# Patient Record
Sex: FEMALE | Race: BLACK OR AFRICAN AMERICAN | HISPANIC OR LATINO | Employment: FULL TIME | URBAN - METROPOLITAN AREA
[De-identification: names, ages, dates, MRNs, and addresses within clinical notes are randomized per-mention and may not be internally consistent; named-entity substitution may affect disease eponyms.]

---

## 2018-06-22 ENCOUNTER — WORRISOME GROWTH - SEE NOTE (OUTPATIENT)
Dept: URBAN - METROPOLITAN AREA CLINIC 36 | Facility: CLINIC | Age: 48
Setting detail: DERMATOLOGY
End: 2018-06-22

## 2018-06-22 PROBLEM — R21 RASH AND OTHER NONSPECIFIC SKIN ERUPTION: Status: RESOLVED | Noted: 2018-06-22

## 2018-06-22 PROBLEM — R21 RASH AND OTHER NONSPECIFIC SKIN ERUPTION: Status: ACTIVE | Noted: 2018-06-22

## 2018-06-22 PROCEDURE — 99202 OFFICE O/P NEW SF 15 MIN: CPT

## 2018-09-12 ENCOUNTER — FOLLOW UP (OUTPATIENT)
Dept: URBAN - METROPOLITAN AREA CLINIC 31 | Facility: CLINIC | Age: 48
Setting detail: DERMATOLOGY
End: 2018-09-12

## 2018-09-12 PROCEDURE — 99213 OFFICE O/P EST LOW 20 MIN: CPT

## 2018-09-13 ENCOUNTER — RX ONLY (RX ONLY)
Age: 48
End: 2018-09-13

## 2018-09-13 RX ORDER — FLUOCINOLONE ACETONIDE, HYDROQUINONE, AND TRETINOIN .1; 40; .5 MG/G; MG/G; MG/G
1 APPLICATION CREAM TOPICAL DAILY
Qty: 30 | Refills: 4
Start: 2018-09-13

## 2018-11-12 ENCOUNTER — FOLLOW UP (OUTPATIENT)
Dept: URBAN - METROPOLITAN AREA CLINIC 32 | Facility: CLINIC | Age: 48
Setting detail: DERMATOLOGY
End: 2018-11-12

## 2018-11-12 PROCEDURE — 99213 OFFICE O/P EST LOW 20 MIN: CPT

## 2019-04-03 ENCOUNTER — FOLLOW UP (OUTPATIENT)
Dept: URBAN - METROPOLITAN AREA CLINIC 31 | Facility: CLINIC | Age: 49
Setting detail: DERMATOLOGY
End: 2019-04-03

## 2019-04-03 ENCOUNTER — RX ONLY (RX ONLY)
Age: 49
End: 2019-04-03

## 2019-04-03 DIAGNOSIS — B07.9 VIRAL WART, UNSPECIFIED: ICD-10-CM

## 2019-04-03 PROCEDURE — 99214 OFFICE O/P EST MOD 30 MIN: CPT

## 2019-08-13 ENCOUNTER — FOLLOW UP (OUTPATIENT)
Dept: URBAN - METROPOLITAN AREA CLINIC 32 | Facility: CLINIC | Age: 49
Setting detail: DERMATOLOGY
End: 2019-08-13

## 2019-08-13 ENCOUNTER — RX ONLY (RX ONLY)
Age: 49
End: 2019-08-13

## 2019-08-13 DIAGNOSIS — L72.0 EPIDERMAL CYST: ICD-10-CM

## 2019-08-13 PROCEDURE — 99213 OFFICE O/P EST LOW 20 MIN: CPT

## 2019-08-13 RX ORDER — FLUOCINONIDE 0.5 MG/ML
1 ML SOLUTION TOPICAL DAILY
Qty: 1 | Refills: 3
Start: 2019-08-13

## 2019-08-13 RX ORDER — TRIAMCINOLONE ACETONIDE 1 MG/G
1 APPLICATION CREAM TOPICAL BID
Qty: 45 | Refills: 1 | Status: DISCONTINUED
Start: 2019-08-13 | End: 2019-10-23

## 2019-10-23 ENCOUNTER — RX ONLY (RX ONLY)
Age: 49
End: 2019-10-23

## 2019-10-23 ENCOUNTER — SEE NOTE (OUTPATIENT)
Dept: URBAN - METROPOLITAN AREA CLINIC 31 | Facility: CLINIC | Age: 49
Setting detail: DERMATOLOGY
End: 2019-10-23

## 2019-10-23 DIAGNOSIS — L57.0 ACTINIC KERATOSIS: ICD-10-CM

## 2019-10-23 PROCEDURE — 99213 OFFICE O/P EST LOW 20 MIN: CPT

## 2019-10-23 RX ORDER — FLUOCINOLONE ACETONIDE, HYDROQUINONE, AND TRETINOIN .1; 40; .5 MG/G; MG/G; MG/G
1 APPLICATION CREAM TOPICAL DAILY
Qty: 30 | Refills: 4
Start: 2019-10-23

## 2019-10-23 RX ORDER — HYDROQUINONE 40 MG/G
1 APPLICATION CREAM TOPICAL ADD'L SIG
Qty: 30 | Refills: 2 | Status: DISCONTINUED
Start: 2019-10-23 | End: 2020-01-08

## 2019-10-23 RX ORDER — TRIAMCINOLONE ACETONIDE 1 MG/G
1 APPLICATION CREAM TOPICAL BID
Qty: 45 | Refills: 1
Start: 2019-10-23

## 2019-12-11 ENCOUNTER — SEE NOTE (OUTPATIENT)
Dept: URBAN - METROPOLITAN AREA CLINIC 31 | Facility: CLINIC | Age: 49
Setting detail: DERMATOLOGY
End: 2019-12-11

## 2019-12-11 DIAGNOSIS — B36.0 PITYRIASIS VERSICOLOR: ICD-10-CM

## 2019-12-11 PROCEDURE — 99212 OFFICE O/P EST SF 10 MIN: CPT

## 2019-12-23 ENCOUNTER — FOLLOW UP (OUTPATIENT)
Dept: URBAN - METROPOLITAN AREA CLINIC 32 | Facility: CLINIC | Age: 49
Setting detail: DERMATOLOGY
End: 2019-12-23

## 2019-12-23 DIAGNOSIS — L72.3 SEBACEOUS CYST: ICD-10-CM

## 2019-12-23 PROCEDURE — 99212 OFFICE O/P EST SF 10 MIN: CPT

## 2020-01-08 ENCOUNTER — FOLLOW UP (OUTPATIENT)
Dept: URBAN - METROPOLITAN AREA CLINIC 31 | Facility: CLINIC | Age: 50
Setting detail: DERMATOLOGY
End: 2020-01-08

## 2020-01-08 DIAGNOSIS — L84 CORNS AND CALLOSITIES: ICD-10-CM

## 2020-01-08 PROCEDURE — OTHER NO CHARGE OFFICE VISIT: OTHER

## 2020-07-27 ENCOUNTER — RASH (OUTPATIENT)
Dept: URBAN - METROPOLITAN AREA CLINIC 32 | Facility: CLINIC | Age: 50
Setting detail: DERMATOLOGY
End: 2020-07-27

## 2020-07-27 DIAGNOSIS — L40.0 PSORIASIS VULGARIS: ICD-10-CM

## 2020-07-27 DIAGNOSIS — D48.5 NEOPLASM OF UNCERTAIN BEHAVIOR OF SKIN: ICD-10-CM

## 2020-07-27 PROCEDURE — 99213 OFFICE O/P EST LOW 20 MIN: CPT

## 2020-07-27 RX ORDER — TRIAMCINOLONE ACETONIDE 1 MG/G
1 APPLICATION CREAM TOPICAL BID
Qty: 80 | Refills: 0
Start: 2020-07-27

## 2020-08-18 ENCOUNTER — RX ONLY (RX ONLY)
Age: 50
End: 2020-08-18

## 2020-08-18 RX ORDER — CLOBETASOL PROPIONATE 0.5 MG/G
1 APPLICATION CREAM TOPICAL BID
Qty: 60 | Refills: 1
Start: 2020-08-18

## 2020-10-06 ENCOUNTER — RX ONLY (RX ONLY)
Age: 50
End: 2020-10-06

## 2020-10-06 ENCOUNTER — ECZEMA (OUTPATIENT)
Dept: URBAN - METROPOLITAN AREA CLINIC 32 | Facility: CLINIC | Age: 50
Setting detail: DERMATOLOGY
End: 2020-10-06

## 2020-10-06 DIAGNOSIS — L72.0 EPIDERMAL CYST: ICD-10-CM

## 2020-10-06 PROBLEM — R21 RASH AND OTHER NONSPECIFIC SKIN ERUPTION: Status: ACTIVE | Noted: 2020-10-06

## 2020-10-06 PROBLEM — R21 RASH AND OTHER NONSPECIFIC SKIN ERUPTION: Status: RESOLVED | Noted: 2020-10-06

## 2020-10-06 PROCEDURE — 99213 OFFICE O/P EST LOW 20 MIN: CPT

## 2020-10-19 ENCOUNTER — RASH (OUTPATIENT)
Dept: URBAN - METROPOLITAN AREA CLINIC 32 | Facility: CLINIC | Age: 50
Setting detail: DERMATOLOGY
End: 2020-10-19

## 2020-10-19 DIAGNOSIS — L82.0 INFLAMED SEBORRHEIC KERATOSIS: ICD-10-CM

## 2020-10-19 PROCEDURE — 11104 PUNCH BX SKIN SINGLE LESION: CPT

## 2020-10-19 PROCEDURE — 11105 PUNCH BX SKIN EA SEP/ADDL: CPT

## 2020-10-19 PROCEDURE — 99213 OFFICE O/P EST LOW 20 MIN: CPT

## 2020-10-27 ENCOUNTER — RX ONLY (RX ONLY)
Age: 50
End: 2020-10-27

## 2020-10-27 RX ORDER — HYDROQUINONE 40 MG/G
1 APPLICATION CREAM TOPICAL ADD'L SIG
Qty: 30 | Refills: 3
Start: 2020-10-27

## 2020-11-02 ENCOUNTER — SUTURE REMOVAL (OUTPATIENT)
Dept: URBAN - METROPOLITAN AREA CLINIC 32 | Facility: CLINIC | Age: 50
Setting detail: DERMATOLOGY
End: 2020-11-02

## 2020-11-02 DIAGNOSIS — Z80.8 FAMILY HISTORY OF MALIGNANT NEOPLASM OF OTHER ORGANS OR SYSTEMS: ICD-10-CM

## 2020-11-02 DIAGNOSIS — R20.2 PARESTHESIA OF SKIN: ICD-10-CM

## 2020-11-02 DIAGNOSIS — D22.5 MELANOCYTIC NEVI OF TRUNK: ICD-10-CM

## 2020-11-02 PROCEDURE — 99024 POSTOP FOLLOW-UP VISIT: CPT

## 2020-12-22 ENCOUNTER — RX ONLY (RX ONLY)
Age: 50
End: 2020-12-22

## 2020-12-22 RX ORDER — TRIAMCINOLONE ACETONIDE 1 MG/G
1 APPLICATION CREAM TOPICAL BID
Qty: 45 | Refills: 0
Start: 2020-12-22

## 2021-01-11 ENCOUNTER — RX ONLY (RX ONLY)
Age: 51
End: 2021-01-11

## 2021-01-11 RX ORDER — KETOCONAZOLE 20 MG/ML
1 APPLICATION SHAMPOO, SUSPENSION TOPICAL ADD'L SIG
Qty: 120 | Refills: 4 | Status: DISCONTINUED
Start: 2021-01-11 | End: 2021-03-15

## 2021-03-15 ENCOUNTER — RX ONLY (RX ONLY)
Age: 51
End: 2021-03-15

## 2021-03-15 ENCOUNTER — TELEHEALTH (OUTPATIENT)
Dept: URBAN - METROPOLITAN AREA CLINIC 30 | Facility: CLINIC | Age: 51
Setting detail: DERMATOLOGY
End: 2021-03-15

## 2021-03-15 DIAGNOSIS — L63.8 OTHER ALOPECIA AREATA: ICD-10-CM

## 2021-03-15 PROCEDURE — 99214 OFFICE O/P EST MOD 30 MIN: CPT

## 2021-03-15 RX ORDER — KETOCONAZOLE 20 MG/ML
1 APPLICATION SHAMPOO, SUSPENSION TOPICAL ADD'L SIG
Qty: 120 | Refills: 4
Start: 2021-03-15

## 2021-03-15 RX ORDER — FLUOCINONIDE 0.5 MG/ML
1 ML SOLUTION TOPICAL BID
Qty: 60 | Refills: 3
Start: 2021-03-15

## 2021-04-07 ENCOUNTER — RX ONLY (RX ONLY)
Age: 51
End: 2021-04-07

## 2021-04-07 RX ORDER — HYDROQUINONE 40 MG/G
A SMALL AMOUNT CREAM TOPICAL ONCE A DAY
Qty: 30 | Refills: 2
Start: 2021-04-07

## 2021-08-04 ENCOUNTER — P2P PATIENT RECORD (OUTPATIENT)
Age: 51
End: 2021-08-04

## 2021-08-27 ENCOUNTER — DASHBOARD ENCOUNTERS (OUTPATIENT)
Age: 51
End: 2021-08-27

## 2021-08-30 ENCOUNTER — TELEPHONE ENCOUNTER (OUTPATIENT)
Dept: URBAN - METROPOLITAN AREA CLINIC 17 | Facility: CLINIC | Age: 51
End: 2021-08-30

## 2021-08-30 ENCOUNTER — OFFICE VISIT (OUTPATIENT)
Dept: URBAN - METROPOLITAN AREA TELEHEALTH 2 | Facility: TELEHEALTH | Age: 51
End: 2021-08-30
Payer: COMMERCIAL

## 2021-08-30 DIAGNOSIS — Z12.11 COLON CANCER SCREENING: ICD-10-CM

## 2021-08-30 DIAGNOSIS — K59.09 CHANGE IN BOWEL MOVEMENTS INTERMITTENT CONSTIPATION. URGENCY IN THE MORNING.: ICD-10-CM

## 2021-08-30 PROBLEM — 14760008 CONSTIPATION: Status: ACTIVE | Noted: 2021-08-30

## 2021-08-30 PROCEDURE — 99202 OFFICE O/P NEW SF 15 MIN: CPT | Performed by: INTERNAL MEDICINE

## 2021-08-30 PROCEDURE — 99242 OFF/OP CONSLTJ NEW/EST SF 20: CPT | Performed by: INTERNAL MEDICINE

## 2021-08-30 RX ORDER — METOPROLOL SUCCINATE 50 MG/1
TABLET, EXTENDED RELEASE ORAL
Qty: 90 | Status: ACTIVE | COMMUNITY

## 2021-08-30 RX ORDER — ATORVASTATIN CALCIUM, FILM COATED 20 MG/1
TAKE 1 TABLET BY MOUTH EVERY DAY TABLET ORAL
Qty: 90 | Refills: 4 | Status: ACTIVE | COMMUNITY

## 2021-08-30 RX ORDER — VALACYCLOVIR 500 MG/1
TAKE 1 TABLET(S) A DAY BY ORAL ROUTE. TAKE TWICE DAILY FOR 3 5 DAYS DURING OUTBREAKS TABLET, FILM COATED ORAL
Qty: 180 | Refills: 3 | Status: ACTIVE | COMMUNITY

## 2021-08-30 RX ORDER — SODIUM PICOSULFATE, MAGNESIUM OXIDE, AND ANHYDROUS CITRIC ACID 10; 3.5; 12 MG/160ML; G/160ML; G/160ML
160 ML LIQUID ORAL
Qty: 320 MILLILITER | Refills: 0 | OUTPATIENT
Start: 2021-08-30 | End: 2021-08-31

## 2021-08-30 RX ORDER — OMEGA-3-ACID ETHYL ESTERS 900 MG/1
CAPSULE ORAL
Qty: 180 | Status: ACTIVE | COMMUNITY

## 2021-08-30 RX ORDER — METFORMIN HYDROCHLORIDE 500 MG/1
TAKE 1 TABLET BY MOUTH TWICE A DAY WITH MEALS TABLET, FILM COATED ORAL
Qty: 180 | Refills: 2 | Status: ACTIVE | COMMUNITY

## 2021-08-30 RX ORDER — AMITRIPTYLINE HYDROCHLORIDE 10 MG/1
TAKE 1 TABLET BY MOUTH EVERYDAY AT BEDTIME TABLET, FILM COATED ORAL
Qty: 90 | Refills: 0 | Status: ACTIVE | COMMUNITY

## 2021-08-30 NOTE — HPI-TODAY'S VISIT:
This is a 50 yo female referred by Dr. Lei Calle for a colonoscopy.  A copy of this document will be sent to the referring provider. The patient denies abdominal pain, weight loss, rectal bleeding,  diarrhea, and a change in bowel habits.  There is no family history of colon cancer or colon polyps.  The patient admits to constipation.  Bowel movements occur daily but she does not have the sensation of complete emptying.

## 2021-10-28 ENCOUNTER — OFFICE VISIT (OUTPATIENT)
Dept: URBAN - METROPOLITAN AREA CLINIC 17 | Facility: CLINIC | Age: 51
End: 2021-10-28

## 2021-11-10 ENCOUNTER — OFFICE VISIT (OUTPATIENT)
Dept: URBAN - METROPOLITAN AREA SURGERY CENTER 30 | Facility: SURGERY CENTER | Age: 51
End: 2021-11-10

## 2022-01-12 ENCOUNTER — OFFICE VISIT (OUTPATIENT)
Dept: URBAN - METROPOLITAN AREA SURGERY CENTER 30 | Facility: SURGERY CENTER | Age: 52
End: 2022-01-12

## 2022-04-08 ENCOUNTER — CLAIMS CREATED FROM THE CLAIM WINDOW (OUTPATIENT)
Dept: URBAN - METROPOLITAN AREA CLINIC 4 | Facility: CLINIC | Age: 52
End: 2022-04-08
Payer: COMMERCIAL

## 2022-04-08 ENCOUNTER — OFFICE VISIT (OUTPATIENT)
Dept: URBAN - METROPOLITAN AREA SURGERY CENTER 30 | Facility: SURGERY CENTER | Age: 52
End: 2022-04-08
Payer: COMMERCIAL

## 2022-04-08 DIAGNOSIS — D12.4 ADENOMA OF DESCENDING COLON: ICD-10-CM

## 2022-04-08 DIAGNOSIS — D12.4 BENIGN NEOPLASM OF DESCENDING COLON: ICD-10-CM

## 2022-04-08 DIAGNOSIS — Z12.11 COLON CANCER SCREENING: ICD-10-CM

## 2022-04-08 PROCEDURE — 88305 TISSUE EXAM BY PATHOLOGIST: CPT | Performed by: PATHOLOGY

## 2022-04-08 PROCEDURE — 45380 COLONOSCOPY AND BIOPSY: CPT | Performed by: INTERNAL MEDICINE

## 2022-04-08 PROCEDURE — G8907 PT DOC NO EVENTS ON DISCHARG: HCPCS | Performed by: INTERNAL MEDICINE

## 2022-04-11 ENCOUNTER — TELEPHONE ENCOUNTER (OUTPATIENT)
Dept: URBAN - METROPOLITAN AREA CLINIC 17 | Facility: CLINIC | Age: 52
End: 2022-04-11

## 2022-04-11 RX ORDER — METFORMIN HYDROCHLORIDE 500 MG/1
TAKE 1 TABLET BY MOUTH TWICE A DAY WITH MEALS TABLET, FILM COATED ORAL
Qty: 180 | Refills: 2 | Status: ACTIVE | COMMUNITY

## 2022-04-11 RX ORDER — VALACYCLOVIR 500 MG/1
TAKE 1 TABLET(S) A DAY BY ORAL ROUTE. TAKE TWICE DAILY FOR 3 5 DAYS DURING OUTBREAKS TABLET, FILM COATED ORAL
Qty: 180 | Refills: 3 | Status: ACTIVE | COMMUNITY

## 2022-04-11 RX ORDER — ATORVASTATIN CALCIUM, FILM COATED 20 MG/1
TAKE 1 TABLET BY MOUTH EVERY DAY TABLET ORAL
Qty: 90 | Refills: 4 | Status: ACTIVE | COMMUNITY

## 2022-04-11 RX ORDER — AMITRIPTYLINE HYDROCHLORIDE 10 MG/1
TAKE 1 TABLET BY MOUTH EVERYDAY AT BEDTIME TABLET, FILM COATED ORAL
Qty: 90 | Refills: 0 | Status: ACTIVE | COMMUNITY

## 2022-04-11 RX ORDER — OMEGA-3-ACID ETHYL ESTERS 900 MG/1
CAPSULE ORAL
Qty: 180 | Status: ACTIVE | COMMUNITY

## 2022-04-11 RX ORDER — METOPROLOL SUCCINATE 50 MG/1
TABLET, EXTENDED RELEASE ORAL
Qty: 90 | Status: ACTIVE | COMMUNITY

## 2022-04-11 RX ORDER — FLUCONAZOLE 150 MG/1
1 TABLET TABLET ORAL
Qty: 2 | Refills: 1 | OUTPATIENT

## 2022-06-15 ENCOUNTER — SEE NOTE (OUTPATIENT)
Dept: URBAN - METROPOLITAN AREA CLINIC 31 | Facility: CLINIC | Age: 52
Setting detail: DERMATOLOGY
End: 2022-06-15

## 2022-06-15 ENCOUNTER — RX ONLY (RX ONLY)
Age: 52
End: 2022-06-15

## 2022-06-15 DIAGNOSIS — L20.84 INTRINSIC (ALLERGIC) ECZEMA: ICD-10-CM

## 2022-06-15 DIAGNOSIS — L73.8 OTHER SPECIFIED FOLLICULAR DISORDERS: ICD-10-CM

## 2022-06-15 PROCEDURE — 99214 OFFICE O/P EST MOD 30 MIN: CPT

## 2022-06-15 RX ORDER — CLINDAMYCIN PHOSPHATE 10 MG/ML
A SMALL AMOUNT LOTION TOPICAL
Qty: 60 | Refills: 0
Start: 2022-06-15

## 2022-07-27 ENCOUNTER — RX ONLY (RX ONLY)
Age: 52
End: 2022-07-27

## 2022-07-27 RX ORDER — FLUOCINONIDE 0.5 MG/ML
1 ML SOLUTION TOPICAL TWICE A DAY
Qty: 60 | Refills: 0
Start: 2022-07-27

## 2022-07-27 RX ORDER — KETOCONAZOLE 20 MG/ML
1 APPLICATION SHAMPOO, SUSPENSION TOPICAL AS DIRECTED
Qty: 120 | Refills: 0
Start: 2022-07-27

## 2024-05-16 NOTE — PHYSICAL EXAM CONSTITUTIONAL:
Date of Service:  05/15/2024    SUBJECTIVE:  This patient presents today complaining of bilateral foot pain for years since about 2016.  They further state both feet bother them about equally.  There is certainly pain and kind of \"pins and needles.\"  At times, they describe it as extreme pain.  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  The patient did try some custom orthotics from [facility], but that seemed to make things worse.  They do have some kind of softer 3/4 length over-the-counter Dr. Barcenas inserts which do help a little bit, can get them to about 4 hours into the shift.    The patient further states that they did see Dr. Crane, which I do see was roughly 3 years or so ago for her nerve conduction test.  They state that basically they were negative.  Also, was sent for physical therapy for plantar fasciitis, but stated that seemed to make it worse also.  Also, in addition to \"pins and needles\", they complain of some \"burning\" especially to the entire bottoms of the feet.    The patient's allergies and medications were reviewed in Epic.  Allergies do include adhesive tapes.  The patient denies diabetes.    OBJECTIVE:  VASCULAR:  Palpable PT and DP pulses bilaterally.  DERMATOLOGICAL:  There is a bit of polish on all 10 toenails, no corns or calluses, warm, integument.  MUSCULOSKELETAL:  There is pes planus upon stance bilaterally, no hallux valgus, just some generalized tenderness when I palpate on the plantar aspect of both feet, but not localized.    I personally interpreted x-rays taken today and they do show small posterior calcaneal spur, do show the pes planus, I do not see any significant degenerative changes of the joints.    ASSESSMENT:  Plantar fasciitis bilaterally.  Pain in feet bilaterally.  Pes planus bilaterally.    PLAN:  I did show the x-rays to them.  I did tell them that I do think some of it is plantar fasciitis, however, it seems like  pleasant, well nourished, well developed, in no acute distress , normal communication ability there is more to within that.  I think there is somewhat of nerve or neuropathy or some other component.  I believe the patient has seen Aubrey ROJAS from Neurology in [facility] and the patient does plan on seeing that provider again.  I just said it does seem like there is more to it, and there is again with \"burning\" and \"pins and needles\", I think there is somewhat of nerve component.  I did mention sometimes nerve conduction tests are negative and a person can still have quite a few symptoms.    I stated cortisone injections are not appropriate.  There is no localized pain, I did say I would send them again to physical therapy, but again the patient has already tried that, which made it worse, and also the custom orthotics made things worse.  If they wanted, I could send orders for a softer, custom orthotic.  For now they will go to neurology again.        Dictated By:  Benji Morse DPM  Signing Provider:  Benji Morse DPM    TR/AQT  D:  05/15/2024 03:16:18 PM  T:  05/15/2024 09:08:11 PM  Job:  [id number]